# Patient Record
Sex: MALE | Race: WHITE | NOT HISPANIC OR LATINO | Employment: FULL TIME | URBAN - METROPOLITAN AREA
[De-identification: names, ages, dates, MRNs, and addresses within clinical notes are randomized per-mention and may not be internally consistent; named-entity substitution may affect disease eponyms.]

---

## 2023-09-04 PROBLEM — L91.8 OTHER HYPERTROPHIC DISORDERS OF THE SKIN: Status: ACTIVE | Noted: 2022-09-07

## 2023-09-04 PROBLEM — L81.4 OTHER MELANIN HYPERPIGMENTATION: Status: ACTIVE | Noted: 2022-09-07

## 2023-09-04 PROBLEM — R73.03 PREDIABETES: Status: ACTIVE | Noted: 2023-09-04

## 2023-09-04 PROBLEM — E78.6 LIPOPROTEIN DEFICIENCY DISORDER: Status: ACTIVE | Noted: 2023-09-04

## 2023-09-04 PROBLEM — R07.9 INTERMITTENT CHEST PAIN: Status: ACTIVE | Noted: 2023-09-04

## 2023-09-04 PROBLEM — L90.5 SCAR CONDITION AND FIBROSIS OF SKIN: Status: ACTIVE | Noted: 2022-09-07

## 2023-09-04 PROBLEM — E88.810 DYSMETABOLIC SYNDROME: Status: ACTIVE | Noted: 2023-09-04

## 2023-09-04 PROBLEM — R20.2 PARESTHESIAS: Status: ACTIVE | Noted: 2023-09-04

## 2023-09-04 PROBLEM — H69.93 DYSFUNCTION OF BOTH EUSTACHIAN TUBES: Status: ACTIVE | Noted: 2023-09-04

## 2023-09-04 PROBLEM — E78.5 HYPERLIPIDEMIA, ACQUIRED: Status: ACTIVE | Noted: 2023-09-04

## 2023-09-04 PROBLEM — J30.9 ALLERGIC RHINITIS: Status: ACTIVE | Noted: 2023-09-04

## 2023-09-04 PROBLEM — L57.0 ACTINIC KERATOSIS: Status: ACTIVE | Noted: 2022-09-07

## 2023-09-04 PROBLEM — I25.810 CAD (CORONARY ARTERY DISEASE) OF ARTERY BYPASS GRAFT: Status: ACTIVE | Noted: 2023-09-04

## 2023-09-04 PROBLEM — L82.1 OTHER SEBORRHEIC KERATOSIS: Status: ACTIVE | Noted: 2022-09-07

## 2023-09-04 PROBLEM — D18.01 HEMANGIOMA OF SKIN AND SUBCUTANEOUS TISSUE: Status: ACTIVE | Noted: 2022-09-07

## 2023-09-04 PROBLEM — I25.10 ATHEROSCLEROSIS OF CORONARY ARTERY: Status: ACTIVE | Noted: 2023-09-04

## 2023-09-04 PROBLEM — I82.90 VENOUS THROMBOSIS: Status: ACTIVE | Noted: 2023-09-04

## 2023-09-04 PROBLEM — D22.70 MELANOCYTIC NEVI OF UNSPECIFIED LOWER LIMB, INCLUDING HIP: Status: ACTIVE | Noted: 2022-09-07

## 2023-09-04 PROBLEM — Z85.828 PERSONAL HISTORY OF OTHER MALIGNANT NEOPLASM OF SKIN: Status: ACTIVE | Noted: 2022-09-07

## 2023-09-04 PROBLEM — R06.89 IMPAIRED GAS EXCHANGE: Status: ACTIVE | Noted: 2023-09-04

## 2023-09-04 PROBLEM — C44.519 BASAL CELL CARCINOMA OF SKIN OF OTHER PART OF TRUNK: Status: ACTIVE | Noted: 2022-09-07

## 2023-09-04 PROBLEM — D48.5 NEOPLASM OF UNCERTAIN BEHAVIOR OF SKIN: Status: ACTIVE | Noted: 2022-09-07

## 2023-09-04 PROBLEM — H65.03 BILATERAL ACUTE SEROUS OTITIS MEDIA: Status: ACTIVE | Noted: 2023-09-04

## 2023-09-04 PROBLEM — D22.60 MELANOCYTIC NEVI OF UNSPECIFIED UPPER LIMB, INCLUDING SHOULDER: Status: ACTIVE | Noted: 2022-09-07

## 2023-09-04 PROBLEM — D22.5 MELANOCYTIC NEVI OF TRUNK: Status: ACTIVE | Noted: 2022-09-07

## 2023-09-04 PROBLEM — R93.89 STANDARD CHEST X-RAY ABNORMAL: Status: ACTIVE | Noted: 2023-09-04

## 2023-09-04 PROBLEM — R52 PAIN: Status: ACTIVE | Noted: 2023-09-04

## 2023-09-04 RX ORDER — CETIRIZINE HYDROCHLORIDE 10 MG/1
1 TABLET ORAL NIGHTLY
COMMUNITY
Start: 2021-11-12

## 2023-09-04 RX ORDER — ALIROCUMAB 75 MG/ML
INJECTION, SOLUTION SUBCUTANEOUS
COMMUNITY
Start: 2020-12-24

## 2023-09-04 RX ORDER — ALBUTEROL SULFATE 0.83 MG/ML
3 SOLUTION RESPIRATORY (INHALATION) EVERY 4 HOURS PRN
COMMUNITY

## 2023-09-04 RX ORDER — NAPROXEN SODIUM 220 MG/1
1 TABLET, FILM COATED ORAL DAILY
COMMUNITY

## 2023-09-04 RX ORDER — GUAIFENESIN 600 MG/1
1 TABLET, EXTENDED RELEASE ORAL 2 TIMES DAILY
COMMUNITY

## 2023-09-04 RX ORDER — NITROGLYCERIN 0.4 MG/1
1 TABLET SUBLINGUAL AS NEEDED
COMMUNITY

## 2023-09-04 RX ORDER — TRAMADOL HYDROCHLORIDE 50 MG/1
1 TABLET ORAL EVERY 6 HOURS PRN
COMMUNITY

## 2023-09-04 RX ORDER — MELOXICAM 15 MG/1
1 TABLET ORAL DAILY
COMMUNITY
Start: 2023-08-02

## 2023-09-04 RX ORDER — LEVOFLOXACIN 750 MG/1
1 TABLET ORAL DAILY
COMMUNITY

## 2023-12-16 ENCOUNTER — LAB (OUTPATIENT)
Dept: LAB | Facility: LAB | Age: 63
End: 2023-12-16
Payer: COMMERCIAL

## 2023-12-18 ENCOUNTER — TELEPHONE (OUTPATIENT)
Dept: CARDIOLOGY | Facility: CLINIC | Age: 63
End: 2023-12-18

## 2023-12-18 ENCOUNTER — LAB (OUTPATIENT)
Dept: LAB | Facility: LAB | Age: 63
End: 2023-12-18
Payer: COMMERCIAL

## 2023-12-18 DIAGNOSIS — E78.5 HYPERLIPIDEMIA, ACQUIRED: ICD-10-CM

## 2023-12-18 DIAGNOSIS — E78.5 HYPERLIPIDEMIA, ACQUIRED: Primary | ICD-10-CM

## 2023-12-18 LAB
CHOLEST SERPL-MCNC: 82 MG/DL (ref 0–199)
CHOLESTEROL/HDL RATIO: 2.1
HDLC SERPL-MCNC: 38.7 MG/DL
LDLC SERPL CALC-MCNC: 9 MG/DL
NON HDL CHOLESTEROL: 43 MG/DL (ref 0–149)
TRIGL SERPL-MCNC: 174 MG/DL (ref 0–149)
VLDL: 35 MG/DL (ref 0–40)

## 2023-12-18 PROCEDURE — 36415 COLL VENOUS BLD VENIPUNCTURE: CPT

## 2023-12-18 PROCEDURE — 80061 LIPID PANEL: CPT

## 2023-12-20 ENCOUNTER — OFFICE VISIT (OUTPATIENT)
Dept: CARDIOLOGY | Facility: CLINIC | Age: 63
End: 2023-12-20
Payer: COMMERCIAL

## 2023-12-20 VITALS
HEART RATE: 62 BPM | BODY MASS INDEX: 30.84 KG/M2 | SYSTOLIC BLOOD PRESSURE: 122 MMHG | OXYGEN SATURATION: 97 % | DIASTOLIC BLOOD PRESSURE: 80 MMHG | WEIGHT: 227.4 LBS

## 2023-12-20 DIAGNOSIS — I25.10 ATHEROSCLEROSIS OF NATIVE CORONARY ARTERY OF NATIVE HEART, UNSPECIFIED WHETHER ANGINA PRESENT: Primary | ICD-10-CM

## 2023-12-20 DIAGNOSIS — E78.5 HYPERLIPIDEMIA, ACQUIRED: ICD-10-CM

## 2023-12-20 PROCEDURE — 99213 OFFICE O/P EST LOW 20 MIN: CPT | Performed by: INTERNAL MEDICINE

## 2023-12-20 RX ORDER — EVOLOCUMAB 140 MG/ML
140 INJECTION, SOLUTION SUBCUTANEOUS
COMMUNITY

## 2023-12-20 ASSESSMENT — ENCOUNTER SYMPTOMS
BLURRED VISION: 0
PALPITATIONS: 0
PARESTHESIAS: 0
DYSPNEA ON EXERTION: 0
ABDOMINAL PAIN: 0
COUGH: 0
DYSURIA: 0
NUMBNESS: 0
HEMATURIA: 0
SHORTNESS OF BREATH: 0

## 2023-12-20 ASSESSMENT — PAIN SCALES - GENERAL: PAINLEVEL: 0-NO PAIN

## 2023-12-20 NOTE — PROGRESS NOTES
Subjective   Alex Ball is a 63 y.o. male.    Chief Complaint:  Follow-up    HPI  Rare atypical chest pains, no anginal type pains.  Physically active without restrictions.  Review of Systems   Constitutional: Positive for malaise/fatigue.   HENT:  Negative for congestion.    Eyes:  Negative for blurred vision.   Cardiovascular:  Negative for chest pain, dyspnea on exertion and palpitations.   Respiratory:  Negative for cough and shortness of breath.    Musculoskeletal:  Negative for joint pain.   Gastrointestinal:  Negative for abdominal pain.   Genitourinary:  Negative for dysuria and hematuria.   Neurological:  Negative for numbness and paresthesias.       Objective   Constitutional:       Appearance: Not in distress.   Eyes:      Conjunctiva/sclera: Conjunctivae normal.   Neck:      Vascular: JVD normal.   Pulmonary:      Breath sounds: Normal breath sounds. No wheezing. No rhonchi. No rales.   Cardiovascular:      Normal rate. Regular rhythm.      Murmurs: There is no murmur.      No gallop.  No click. No rub.   Abdominal:      Palpations: Abdomen is soft.   Neurological:      General: No focal deficit present.      Mental Status: Alert.         Lab Review:   Lab on 12/18/2023   Component Date Value    Cholesterol 12/18/2023 82     HDL-Cholesterol 12/18/2023 38.7     Cholesterol/HDL Ratio 12/18/2023 2.1     LDL Calculated 12/18/2023 9     VLDL 12/18/2023 35     Triglycerides 12/18/2023 174 (H)     Non HDL Cholesterol 12/18/2023 43        Assessment/Plan   The primary encounter diagnosis was Atherosclerosis of native coronary artery of native heart, unspecified whether angina present. A diagnosis of Hyperlipidemia, acquired was also pertinent to this visit.    Hyperlipidemia, acquired  Reviewed lipid panel:  Tchol 82  HDL 38 LDL 9  Repatha has been very effective.  Will continue.  Recheck panel on return.  Should we reduce to once daily?    Atherosclerosis of coronary artery  Cardiac wise stable  without anginal type chest pains.  Stays active.

## 2023-12-20 NOTE — ASSESSMENT & PLAN NOTE
Reviewed lipid panel:  Tchol 82  HDL 38 LDL 9  Repatha has been very effective.  Will continue.  Recheck panel on return.  Should we reduce to once daily?

## 2023-12-29 DIAGNOSIS — J30.9 ALLERGIC RHINITIS, UNSPECIFIED: ICD-10-CM

## 2024-01-03 RX ORDER — FLUTICASONE PROPIONATE 50 MCG
2 SPRAY, SUSPENSION (ML) NASAL DAILY
Qty: 48 ML | Refills: 1 | Status: SHIPPED | OUTPATIENT
Start: 2024-01-03

## 2024-02-04 DIAGNOSIS — E78.5 HYPERLIPIDEMIA, ACQUIRED: ICD-10-CM

## 2024-02-04 DIAGNOSIS — I25.10 ATHEROSCLEROSIS OF NATIVE CORONARY ARTERY OF NATIVE HEART, UNSPECIFIED WHETHER ANGINA PRESENT: Primary | ICD-10-CM

## 2024-02-16 NOTE — TELEPHONE ENCOUNTER
Patient called the office today for a refill Repatha SureClick 140mg/ML Solution Auto-injector sent to [address]  [place] phone [phone]  Thanks

## 2024-02-19 RX ORDER — EVOLOCUMAB 140 MG/ML
140 INJECTION, SOLUTION SUBCUTANEOUS
Qty: 6 ML | Refills: 3 | Status: SHIPPED | OUTPATIENT
Start: 2024-02-19 | End: 2025-02-18

## 2024-03-25 ENCOUNTER — TELEPHONE (OUTPATIENT)
Dept: VASCULAR SURGERY | Facility: CLINIC | Age: 64
End: 2024-03-25
Payer: COMMERCIAL

## 2024-06-28 PROBLEM — I25.810 CAD (CORONARY ARTERY DISEASE) OF ARTERY BYPASS GRAFT: Status: RESOLVED | Noted: 2023-09-04 | Resolved: 2024-06-28

## 2024-06-28 NOTE — ASSESSMENT & PLAN NOTE
Lipids done in December: Tchol 82  HDL 38 LDL 9  The Repatha has been very effective in reducing LDL cholesterol.  Will plan on rechecking lipids on return visit.

## 2024-07-01 ENCOUNTER — LAB (OUTPATIENT)
Dept: LAB | Facility: LAB | Age: 64
End: 2024-07-01
Payer: COMMERCIAL

## 2024-07-01 DIAGNOSIS — E78.5 HYPERLIPIDEMIA, ACQUIRED: ICD-10-CM

## 2024-07-01 LAB
CHOLEST SERPL-MCNC: 75 MG/DL (ref 133–200)
CHOLEST/HDLC SERPL: 2.3 {RATIO}
HDLC SERPL-MCNC: 32 MG/DL
LDLC SERPL CALC-MCNC: 13 MG/DL (ref 65–130)
TRIGL SERPL-MCNC: 151 MG/DL (ref 40–150)

## 2024-07-01 PROCEDURE — 36415 COLL VENOUS BLD VENIPUNCTURE: CPT

## 2024-07-01 PROCEDURE — 80061 LIPID PANEL: CPT

## 2024-07-02 ENCOUNTER — OFFICE VISIT (OUTPATIENT)
Dept: CARDIOLOGY | Facility: CLINIC | Age: 64
End: 2024-07-02
Payer: COMMERCIAL

## 2024-07-02 VITALS
SYSTOLIC BLOOD PRESSURE: 130 MMHG | HEART RATE: 70 BPM | DIASTOLIC BLOOD PRESSURE: 80 MMHG | BODY MASS INDEX: 29.57 KG/M2 | WEIGHT: 218 LBS

## 2024-07-02 DIAGNOSIS — I25.10 ATHEROSCLEROSIS OF NATIVE CORONARY ARTERY OF NATIVE HEART, UNSPECIFIED WHETHER ANGINA PRESENT: Primary | ICD-10-CM

## 2024-07-02 DIAGNOSIS — E78.5 HYPERLIPIDEMIA, ACQUIRED: ICD-10-CM

## 2024-07-02 PROCEDURE — 99213 OFFICE O/P EST LOW 20 MIN: CPT | Performed by: INTERNAL MEDICINE

## 2024-07-02 PROCEDURE — 1036F TOBACCO NON-USER: CPT | Performed by: INTERNAL MEDICINE

## 2024-07-02 ASSESSMENT — ENCOUNTER SYMPTOMS
NUMBNESS: 0
PARESTHESIAS: 0
SHORTNESS OF BREATH: 0
LOSS OF SENSATION IN FEET: 0
BLURRED VISION: 0
PALPITATIONS: 0
COUGH: 0
HEMATURIA: 0
DYSURIA: 0
OCCASIONAL FEELINGS OF UNSTEADINESS: 0
DYSPNEA ON EXERTION: 0
DEPRESSION: 0
ABDOMINAL PAIN: 0

## 2024-07-02 NOTE — PROGRESS NOTES
Subjective   Alex Ball is a 64 y.o. male.    Chief Complaint:  Follow-up    HPI  Overall patient feels well.  He has some left hip pain that limits his activity level.  No anginal type pains.  Stays active working on a daily basis.  Review of Systems   Constitutional: Negative for malaise/fatigue.   HENT:  Negative for congestion.    Eyes:  Negative for blurred vision.   Cardiovascular:  Negative for chest pain, dyspnea on exertion and palpitations.   Respiratory:  Negative for cough and shortness of breath.    Musculoskeletal:  Positive for joint pain.   Gastrointestinal:  Negative for abdominal pain.   Genitourinary:  Negative for dysuria and hematuria.   Neurological:  Negative for numbness and paresthesias.       Objective   Constitutional:       Appearance: Not in distress.   Eyes:      Conjunctiva/sclera: Conjunctivae normal.   Neck:      Vascular: JVD normal.   Pulmonary:      Breath sounds: Normal breath sounds. No wheezing. No rhonchi. No rales.   Cardiovascular:      Normal rate. Regular rhythm.      Murmurs: There is no murmur.      No gallop.  No click. No rub.   Abdominal:      Palpations: Abdomen is soft.   Neurological:      General: No focal deficit present.      Mental Status: Alert.         Lab Review:   Lab on 07/01/2024   Component Date Value    Cholesterol 07/01/2024 75 (L)     HDL-Cholesterol 07/01/2024 32.0 (L)     Cholesterol/HDL Ratio 07/01/2024 2.3     LDL Calculated 07/01/2024 13 (L)     Triglycerides 07/01/2024 151 (H)        Assessment/Plan   The primary encounter diagnosis was Atherosclerosis of native coronary artery of native heart, unspecified whether angina present. A diagnosis of Hyperlipidemia, acquired was also pertinent to this visit.    Hyperlipidemia, acquired  Lipids done in December: Tchol 82  HDL 38 LDL 9  The Repatha has been very effective in reducing LDL cholesterol.  Will plan on rechecking lipids on return visit.    Atherosclerosis of coronary  artery  Stable with no anginal type pains.  Remains physically active with some limitations based on his hip pain.  Will continue standard risk factor modification and follow on a clinical basis.  Blood pressure was borderline today we did discuss saman sodium restriction and exercise to reduce blood pressure.

## 2024-07-02 NOTE — ASSESSMENT & PLAN NOTE
Stable with no anginal type pains.  Remains physically active with some limitations based on his hip pain.  Will continue standard risk factor modification and follow on a clinical basis.  Blood pressure was borderline today we did discuss saman sodium restriction and exercise to reduce blood pressure.

## 2024-09-18 ENCOUNTER — APPOINTMENT (OUTPATIENT)
Dept: DERMATOLOGY | Facility: CLINIC | Age: 64
End: 2024-09-18
Payer: COMMERCIAL

## 2024-11-14 ENCOUNTER — TELEPHONE (OUTPATIENT)
Dept: DERMATOLOGY | Facility: CLINIC | Age: 64
End: 2024-11-14
Payer: COMMERCIAL

## 2024-12-10 ENCOUNTER — APPOINTMENT (OUTPATIENT)
Dept: DERMATOLOGY | Facility: CLINIC | Age: 64
End: 2024-12-10
Payer: COMMERCIAL

## 2025-01-10 ENCOUNTER — OFFICE VISIT (OUTPATIENT)
Dept: URGENT CARE | Age: 65
End: 2025-01-10
Payer: COMMERCIAL

## 2025-01-10 VITALS
TEMPERATURE: 98.1 F | DIASTOLIC BLOOD PRESSURE: 90 MMHG | WEIGHT: 218 LBS | BODY MASS INDEX: 29.57 KG/M2 | SYSTOLIC BLOOD PRESSURE: 157 MMHG | HEART RATE: 65 BPM | RESPIRATION RATE: 16 BRPM | OXYGEN SATURATION: 96 %

## 2025-01-10 DIAGNOSIS — M54.50 ACUTE LEFT-SIDED LOW BACK PAIN, UNSPECIFIED WHETHER SCIATICA PRESENT: Primary | ICD-10-CM

## 2025-01-10 RX ORDER — PREDNISONE 20 MG/1
20 TABLET ORAL DAILY
Qty: 5 TABLET | Refills: 0 | Status: SHIPPED | OUTPATIENT
Start: 2025-01-10 | End: 2025-01-15

## 2025-01-10 ASSESSMENT — ENCOUNTER SYMPTOMS: BACK PAIN: 1

## 2025-01-10 NOTE — PATIENT INSTRUCTIONS
Start Prednisone as directed, go to emergency department with worsening symptoms, follow up with orthopedics

## 2025-01-10 NOTE — PROGRESS NOTES
Subjective   Patient ID: Alex Ball is a 64 y.o. male. They present today with a chief complaint of Back Pain (Was lifting windows at home happened a week ago).    History of Present Illness  HPI    Past Medical History  Allergies as of 01/10/2025 - Reviewed 01/10/2025   Allergen Reaction Noted    Atorvastatin calcium Other 09/04/2023    Pravastatin sodium Other 09/04/2023    Rosuvastatin calcium Other 09/04/2023    Simvastatin Other 09/04/2023    Penicillins Unknown 05/31/2019       (Not in a hospital admission)       Past Medical History:   Diagnosis Date    CAD (coronary artery disease)     Hyperlipidemia        No past surgical history on file.     reports that he has quit smoking. His smoking use included cigarettes. He has never used smokeless tobacco. He reports current alcohol use.    Review of Systems  Review of Systems   Musculoskeletal:  Positive for back pain.                                  Objective    Vitals:    01/10/25 0948   BP: 157/90   BP Location: Left arm   Patient Position: Sitting   BP Cuff Size: Large adult   Pulse: 65   Resp: 16   Temp: 36.7 °C (98.1 °F)   TempSrc: Oral   SpO2: 96%   Weight: 98.9 kg (218 lb)     No LMP for male patient.    Physical Exam  Vitals reviewed.   Constitutional:       Appearance: Normal appearance.   HENT:      Head: Normocephalic and atraumatic.      Right Ear: Tympanic membrane, ear canal and external ear normal.      Left Ear: Tympanic membrane, ear canal and external ear normal.      Nose: Nose normal.      Mouth/Throat:      Mouth: Mucous membranes are moist.      Pharynx: Oropharynx is clear.   Eyes:      Extraocular Movements: Extraocular movements intact.      Conjunctiva/sclera: Conjunctivae normal.      Pupils: Pupils are equal, round, and reactive to light.   Cardiovascular:      Rate and Rhythm: Normal rate and regular rhythm.      Pulses: Normal pulses.      Heart sounds: Normal heart sounds.   Pulmonary:      Effort: Pulmonary effort is  normal.      Breath sounds: Normal breath sounds.   Musculoskeletal:         General: Tenderness present. Normal range of motion.      Cervical back: Normal range of motion.   Skin:     General: Skin is warm.      Capillary Refill: Capillary refill takes less than 2 seconds.   Neurological:      General: No focal deficit present.      Mental Status: He is alert and oriented to person, place, and time.   Psychiatric:         Mood and Affect: Mood normal.         Behavior: Behavior normal.         Procedures    Point of Care Test & Imaging Results from this visit  No results found for this visit on 01/10/25.   No results found.    Diagnostic study results (if any) were reviewed by MARY Do.    Assessment/Plan   Allergies, medications, history, and pertinent labs/EKGs/Imaging reviewed by MARY Do.     Medical Decision Making  Patient is 64-year-old male who has chronic back pain.  He reports that his back started being aggravated couple days ago.  Said he did lift some windows a week ago.  Pain is on the left side.  There is no obvious deformity.  We do not have radiology here today.  Patient says he has had x-rays in the past is does not want an x-ray today patient just has mild pain denies numbness or tingling in buttocks or groin denies loss of bowel or bladder.  Denies numbness in legs.  He reports that he did feel a little bit of pain going down his left leg.  Patient is to start prednisone as directed and follow-up with orthopedics tomorrow.  Patient is to go to the emergency department with any red flags and any worsening symptoms if he feels any numbness in his buttocks or groin or loss of bowel or bladder patient is to report to the emergency department patient agrees with plan of care patient left in stable condition    Orders and Diagnoses  There are no diagnoses linked to this encounter.    Medical Admin Record      Patient disposition:  Home    Electronically signed by SHIRLEY Do-RUSTY  10:27 AM

## 2025-01-29 NOTE — ASSESSMENT & PLAN NOTE
Reviewed lipid panel.  LDL cholesterol down to 28 on the Repatha therapy.  Very good.  Will recheck after next visit.

## 2025-02-01 LAB
CHOLEST SERPL-MCNC: 85 MG/DL
CHOLEST/HDLC SERPL: 2.6 (CALC)
HDLC SERPL-MCNC: 33 MG/DL
LDLC SERPL CALC-MCNC: 28 MG/DL (CALC)
NONHDLC SERPL-MCNC: 52 MG/DL (CALC)
TRIGL SERPL-MCNC: 154 MG/DL

## 2025-02-04 ENCOUNTER — OFFICE VISIT (OUTPATIENT)
Dept: CARDIOLOGY | Facility: CLINIC | Age: 65
End: 2025-02-04
Payer: COMMERCIAL

## 2025-02-04 VITALS
BODY MASS INDEX: 30.38 KG/M2 | SYSTOLIC BLOOD PRESSURE: 158 MMHG | WEIGHT: 224 LBS | OXYGEN SATURATION: 97 % | RESPIRATION RATE: 16 BRPM | DIASTOLIC BLOOD PRESSURE: 90 MMHG | HEART RATE: 58 BPM

## 2025-02-04 DIAGNOSIS — E78.5 HYPERLIPIDEMIA, ACQUIRED: ICD-10-CM

## 2025-02-04 DIAGNOSIS — I10 PRIMARY HYPERTENSION: ICD-10-CM

## 2025-02-04 DIAGNOSIS — I25.10 ATHEROSCLEROSIS OF NATIVE CORONARY ARTERY OF NATIVE HEART, UNSPECIFIED WHETHER ANGINA PRESENT: Primary | ICD-10-CM

## 2025-02-04 PROCEDURE — 1036F TOBACCO NON-USER: CPT | Performed by: INTERNAL MEDICINE

## 2025-02-04 PROCEDURE — 99214 OFFICE O/P EST MOD 30 MIN: CPT | Performed by: INTERNAL MEDICINE

## 2025-02-04 PROCEDURE — 3077F SYST BP >= 140 MM HG: CPT | Performed by: INTERNAL MEDICINE

## 2025-02-04 PROCEDURE — 1126F AMNT PAIN NOTED NONE PRSNT: CPT | Performed by: INTERNAL MEDICINE

## 2025-02-04 PROCEDURE — 3080F DIAST BP >= 90 MM HG: CPT | Performed by: INTERNAL MEDICINE

## 2025-02-04 PROCEDURE — 1159F MED LIST DOCD IN RCRD: CPT | Performed by: INTERNAL MEDICINE

## 2025-02-04 RX ORDER — AMLODIPINE BESYLATE 5 MG/1
5 TABLET ORAL DAILY
Qty: 90 TABLET | Refills: 3 | Status: SHIPPED | OUTPATIENT
Start: 2025-02-04 | End: 2026-02-04

## 2025-02-04 ASSESSMENT — ENCOUNTER SYMPTOMS
BLURRED VISION: 0
PARESTHESIAS: 0
LOSS OF SENSATION IN FEET: 0
DYSPNEA ON EXERTION: 0
BACK PAIN: 1
OCCASIONAL FEELINGS OF UNSTEADINESS: 0
ABDOMINAL PAIN: 0
PALPITATIONS: 0
DYSURIA: 0
COUGH: 0
HEMATURIA: 0
NUMBNESS: 0
DEPRESSION: 0
SHORTNESS OF BREATH: 0

## 2025-02-04 ASSESSMENT — PAIN SCALES - GENERAL: PAINLEVEL_OUTOF10: 0-NO PAIN

## 2025-02-04 ASSESSMENT — PATIENT HEALTH QUESTIONNAIRE - PHQ9
SUM OF ALL RESPONSES TO PHQ9 QUESTIONS 1 AND 2: 0
1. LITTLE INTEREST OR PLEASURE IN DOING THINGS: NOT AT ALL
2. FEELING DOWN, DEPRESSED OR HOPELESS: NOT AT ALL

## 2025-02-04 ASSESSMENT — LIFESTYLE VARIABLES
HAVE YOU OR SOMEONE ELSE BEEN INJURED AS A RESULT OF YOUR DRINKING: NO
HAS A RELATIVE, FRIEND, DOCTOR, OR ANOTHER HEALTH PROFESSIONAL EXPRESSED CONCERN ABOUT YOUR DRINKING OR SUGGESTED YOU CUT DOWN: NO
HOW OFTEN DO YOU HAVE A DRINK CONTAINING ALCOHOL: MONTHLY OR LESS
AUDIT-C TOTAL SCORE: 1
AUDIT TOTAL SCORE: 1
HOW MANY STANDARD DRINKS CONTAINING ALCOHOL DO YOU HAVE ON A TYPICAL DAY: 1 OR 2
SKIP TO QUESTIONS 9-10: 1
HOW OFTEN DO YOU HAVE SIX OR MORE DRINKS ON ONE OCCASION: NEVER

## 2025-02-04 NOTE — ASSESSMENT & PLAN NOTE
Stable with no anginal type chest pains.  We will continue standard risk factor modification and follow on a clinical basis.

## 2025-02-04 NOTE — ASSESSMENT & PLAN NOTE
Blood pressure remains elevated.  He is also taking meloxicam on a daily basis and has significant back and leg pains.  This may be driving the blood pressure as well.  Either way I do not think there is going to be a rapid resolution of the discomfort thus I believe we should add an antihypertensive agent.  Will order a basic metabolic panel to assess kidney function as well as a renin angiotensin ratio.  Will add amlodipine 5 mg daily and bring the patient back in approximately 12 weeks and reassess blood pressure effects.

## 2025-02-04 NOTE — PROGRESS NOTES
Subjective   Alex Ball is a 65 y.o. male.    Chief Complaint:  Follow-up (Follow up/)    HPI  Patient states that his back pain and sciatica is flaring up.  Otherwise feels relatively good.  No chest pain or anginal type symptoms.  Still works on a regular basis.    Review of Systems   Constitutional: Negative for malaise/fatigue.   HENT:  Negative for congestion.    Eyes:  Negative for blurred vision.   Cardiovascular:  Negative for chest pain, dyspnea on exertion and palpitations.   Respiratory:  Negative for cough and shortness of breath.    Musculoskeletal:  Positive for back pain and joint pain.   Gastrointestinal:  Negative for abdominal pain.   Genitourinary:  Negative for dysuria and hematuria.   Neurological:  Negative for numbness and paresthesias.       Objective   Constitutional:       Appearance: Not in distress.   Eyes:      Conjunctiva/sclera: Conjunctivae normal.   Neck:      Vascular: JVD normal.   Pulmonary:      Breath sounds: Normal breath sounds. No wheezing. No rhonchi. No rales.   Cardiovascular:      Normal rate. Regular rhythm.      Murmurs: There is no murmur.      No gallop.  No click. No rub.   Abdominal:      Palpations: Abdomen is soft.   Neurological:      General: No focal deficit present.      Mental Status: Alert.         Lab Review:   Orders Only on 01/31/2025   Component Date Value    CHOLESTEROL, TOTAL 01/31/2025 85     HDL CHOLESTEROL 01/31/2025 33 (L)     TRIGLYCERIDES 01/31/2025 154 (H)     LDL-CHOLESTEROL 01/31/2025 28     CHOL/HDLC RATIO 01/31/2025 2.6     NON HDL CHOLESTEROL 01/31/2025 52        Assessment/Plan   The primary encounter diagnosis was Atherosclerosis of native coronary artery of native heart, unspecified whether angina present. Diagnoses of Hyperlipidemia, acquired and Primary hypertension were also pertinent to this visit.    Hyperlipidemia, acquired  Reviewed lipid panel.  LDL cholesterol down to 28 on the Repatha therapy.  Very good.  Will  recheck after next visit.    Atherosclerosis of coronary artery  Stable with no anginal type chest pains.  We will continue standard risk factor modification and follow on a clinical basis.    Primary hypertension  Blood pressure remains elevated.  He is also taking meloxicam on a daily basis and has significant back and leg pains.  This may be driving the blood pressure as well.  Either way I do not think there is going to be a rapid resolution of the discomfort thus I believe we should add an antihypertensive agent.  Will order a basic metabolic panel to assess kidney function as well as a renin angiotensin ratio.  Will add amlodipine 5 mg daily and bring the patient back in approximately 12 weeks and reassess blood pressure effects.

## 2025-02-06 LAB
ALDOST SERPL-MCNC: NORMAL NG/DL
ALDOST/RENIN PLAS-RTO: NORMAL {RATIO}
ANION GAP SERPL CALCULATED.4IONS-SCNC: 10 MMOL/L (CALC) (ref 7–17)
BUN SERPL-MCNC: 15 MG/DL (ref 7–25)
BUN/CREAT SERPL: NORMAL (CALC) (ref 6–22)
CALCIUM SERPL-MCNC: 9.4 MG/DL (ref 8.6–10.3)
CHLORIDE SERPL-SCNC: 105 MMOL/L (ref 98–110)
CO2 SERPL-SCNC: 26 MMOL/L (ref 20–32)
CREAT SERPL-MCNC: 0.71 MG/DL (ref 0.7–1.35)
EGFRCR SERPLBLD CKD-EPI 2021: 102 ML/MIN/1.73M2
GLUCOSE SERPL-MCNC: 98 MG/DL (ref 65–99)
POTASSIUM SERPL-SCNC: 4.4 MMOL/L (ref 3.5–5.3)
RENIN PLAS-CCNC: NORMAL NG/ML/H
SODIUM SERPL-SCNC: 141 MMOL/L (ref 135–146)

## 2025-02-15 LAB
ALDOST SERPL-MCNC: 3 NG/DL
ALDOST/RENIN PLAS-RTO: 5.8 RATIO (ref 0.9–28.9)
ANION GAP SERPL CALCULATED.4IONS-SCNC: 10 MMOL/L (CALC) (ref 7–17)
BUN SERPL-MCNC: 15 MG/DL (ref 7–25)
BUN/CREAT SERPL: NORMAL (CALC) (ref 6–22)
CALCIUM SERPL-MCNC: 9.4 MG/DL (ref 8.6–10.3)
CHLORIDE SERPL-SCNC: 105 MMOL/L (ref 98–110)
CO2 SERPL-SCNC: 26 MMOL/L (ref 20–32)
CREAT SERPL-MCNC: 0.71 MG/DL (ref 0.7–1.35)
EGFRCR SERPLBLD CKD-EPI 2021: 102 ML/MIN/1.73M2
GLUCOSE SERPL-MCNC: 98 MG/DL (ref 65–99)
POTASSIUM SERPL-SCNC: 4.4 MMOL/L (ref 3.5–5.3)
RENIN PLAS-CCNC: 0.52 NG/ML/H (ref 0.25–5.82)
SODIUM SERPL-SCNC: 141 MMOL/L (ref 135–146)

## 2025-02-26 DIAGNOSIS — E78.5 HYPERLIPIDEMIA, ACQUIRED: ICD-10-CM

## 2025-02-26 DIAGNOSIS — I25.10 ATHEROSCLEROSIS OF NATIVE CORONARY ARTERY OF NATIVE HEART, UNSPECIFIED WHETHER ANGINA PRESENT: ICD-10-CM

## 2025-02-27 RX ORDER — EVOLOCUMAB 140 MG/ML
INJECTION, SOLUTION SUBCUTANEOUS
Qty: 2 EACH | Refills: 11 | Status: SHIPPED | OUTPATIENT
Start: 2025-02-27

## 2025-02-27 NOTE — TELEPHONE ENCOUNTER
LOV: 2025  Upcomin/15/2025    Requested Prescriptions     Pending Prescriptions Disp Refills    Repatha SureClick 140 mg/mL injection [Pharmacy Med Name: REPATHA 140 MG/ML SURECLICK]  11     Sig: INJECT 1 ML UNDER THE SKIN EVERY 14 DAYS

## 2025-03-26 ENCOUNTER — OFFICE VISIT (OUTPATIENT)
Dept: OTOLARYNGOLOGY | Facility: CLINIC | Age: 65
End: 2025-03-26
Payer: COMMERCIAL

## 2025-03-26 DIAGNOSIS — H69.93 DYSFUNCTION OF BOTH EUSTACHIAN TUBES: Primary | ICD-10-CM

## 2025-03-26 RX ORDER — FLUTICASONE PROPIONATE 50 MCG
2 SPRAY, SUSPENSION (ML) NASAL DAILY
Qty: 48 G | Refills: 7 | Status: SHIPPED | OUTPATIENT
Start: 2025-03-26 | End: 2026-03-26

## 2025-03-26 RX ORDER — FLUTICASONE PROPIONATE 50 MCG
2 SPRAY, SUSPENSION (ML) NASAL DAILY
Qty: 48 G | Refills: 3 | Status: SHIPPED | OUTPATIENT
Start: 2025-03-26 | End: 2025-03-26 | Stop reason: WASHOUT

## 2025-03-26 NOTE — PROGRESS NOTES
Chief Complaint     Ear problem, ETD     History of Present Illness    03.26.2025: He feels bilateral ear fullness, if he does not use fluticasone nasal spray. History of ETD. Wants a refill.    On examination, TMs look intact. Nasal septum deviated t or left, no visible nasal or postnasal discharge. No palpable neck mass.    Plan:  1- continue fluticasone nasal spray  _________________________________________________________________    10.21.2022:    Mr. Ball is a 61 yo M. His right ear gets clogged off and on, lately his left ear does it too.  Secondly, he snores at night. His wife is worries about him having CHRISSY.     On examination, ears look essentially normal  Nasal septum deviated to left  Long uvula     Dx:  1- Disorder of ETD  2- Snoring possible CHRISSY  3- Deviated nasal septum to left, long uvula.     Plan;  1- FLuticasone nasal spray  2- pseudoephedrine 120 mg tab  3- home seep study      Review of Systems     ENMT: the ears feel full.   All other systems have been reviewed and are negative for complaint.      Active Problems     · Allergic rhinitis (477.9) (J30.9)   · Atherosclerosis of coronary artery (414.00) (I25.10)   · Bilateral acute serous otitis media, recurrence not specified (381.01) (H65.03)   · Dysfunction of both eustachian tubes (381.81) (H69.83)   · Dysmetabolic syndrome (277.7) (E88.81)   · Hyperlipidemia (272.4) (E78.5)   · Paresthesias (782.0) (R20.2)   · Prediabetes (790.29) (R73.03)   · Sore throat (462) (J02.9)     Family History     · Family history of diabetes mellitus (V18.0) (Z83.3)     Social History     · Cigar smoker (305.1) (F17.290)   · Never a smoker   · Occasional alcohol use     Allergies     · Penicillins   Recorded By: Jemma Ramsay; 10/29/2021 10:24:27 AM     Current Meds     Medication Name Instruction   Amoxicillin 875 MG Oral Tablet i po bid x 7 days   Cetirizine HCl - 10 MG Oral Tablet TAKE 1 TABLET BY MOUTH EVERYDAY AT BEDTIME   methylPREDNISolone 4 MG Oral  Tablet vi po qd, then v po qd, then iv po qd, then iii po qd, then ii po qd, then i po qd, then stop   Praluent 75 MG/ML Subcutaneous Solution Auto-injector        Physical Exam  General appearance: Healthy-appearing, well-nourished, well groomed, in no acute distress.      Head and Face: Atraumatic with no masses, lesions, or scarring.      Facial strength: Normal strength and symmetry, no synkinesis or facial tic.      Eyes: Conjunctivas look non-hyperemic bilaterally     Ears: Bilaterally ear canals look normal. Tympanic membranes intact, no hyperemia, fluid or retraction.      Nose: Mucosa looks normal. No purulent discharge. Septum deviated to left.     Oral Cavity/Mouth: Lips and tongue look normal.      Throat: No postnasal discharge. No tonsil hypertrophy. No hyperemia.     Pulmonary: Normal respiratory effort.      Neurological/Psychiatric: Orientation to person, place, and time: Normal.   Mood and affect: Normal.      Extremities: No clubbing.          Diagnoses/Problems     · Dysfunction of both eustachian tubes (381.81) (H69.83)   · Obstructive sleep apnea (327.23) (G47.33)     Orders     · Start: Fluticasone Propionate 50 MCG/ACT Nasal Suspension; USE 2 SPRAYS IN EACH  NOSTRIL ONCE DAILY   · Start: Pseudoephedrine HCl  MG Oral Tablet Extended Release 12 Hour; Take 1  tablet every 12 hours     · Home Sleep Apnea Test; Status:Hold For - Scheduling; Requested for:[id number];    · SINDI; Status:In Progress;   Done: [id number]     Patient Discussion/Summary     03.26.2025: He feels bilateral ear fullness, if he does not use fluticasone nasal spray. History of ETD. Wants a refill.    On examination, TMs look intact. Nasal septum deviated t or left, no visible nasal or postnasal discharge. No palpable neck mass.    Plan:  1- continue fluticasone nasal spray  _________________________________________________________________    10.21.2022: Mr. Ball is a 61 yo M. His right ear gets clogged off and on, lately  his left ear does it too. Secondly, he snores at night. His wife is worries about him having CHRISSY.     On examination, ears look essentially normal  Nasal septum deviated to left  Long uvula     Dx:  1- Disorder of ETD  2- Snoring possible CHRISSY  3- Deviated nasal septum to left, long uvula.     Plan;  1- FLuticasone nasal spray  2- pseudoephedrine 120 mg tab  3- home seep study     ____________________________________________________________________________________________       PATIENT EDUCATION:  Eustachian TUbe Dysfunction  Eustachian tube dysfunction (ETD) is defined as pressure abnormalities in the middle ear which result in symptoms  Signs and symptoms  Symptoms include aural fullness, ears popping, a feeling of pressure in the affected ear(s), a feeling that the affected ear(s) is clogged, crackling, ear pain, tinnitus, autophony, and muffled hearing.[

## 2025-04-09 ENCOUNTER — APPOINTMENT (OUTPATIENT)
Dept: DERMATOLOGY | Facility: CLINIC | Age: 65
End: 2025-04-09
Payer: COMMERCIAL

## 2025-04-09 DIAGNOSIS — L82.1 SEBORRHEIC KERATOSES: ICD-10-CM

## 2025-04-09 DIAGNOSIS — L57.0 ACTINIC KERATOSIS: ICD-10-CM

## 2025-04-09 DIAGNOSIS — D48.5 NEOPLASM OF UNCERTAIN BEHAVIOR OF SKIN: Primary | ICD-10-CM

## 2025-04-09 DIAGNOSIS — B36.0 TINEA VERSICOLOR: ICD-10-CM

## 2025-04-09 DIAGNOSIS — D18.01 CHERRY ANGIOMA: ICD-10-CM

## 2025-04-09 DIAGNOSIS — D22.9 MULTIPLE BENIGN MELANOCYTIC NEVI: ICD-10-CM

## 2025-04-09 DIAGNOSIS — Z12.83 SKIN CANCER SCREENING: ICD-10-CM

## 2025-04-09 DIAGNOSIS — L57.8 SUN-DAMAGED SKIN: ICD-10-CM

## 2025-04-09 PROBLEM — Z86.018 HISTORY OF DYSPLASTIC NEVUS: Status: ACTIVE | Noted: 2025-04-09

## 2025-04-09 PROCEDURE — 99214 OFFICE O/P EST MOD 30 MIN: CPT | Performed by: DERMATOLOGY

## 2025-04-09 PROCEDURE — 17000 DESTRUCT PREMALG LESION: CPT | Performed by: DERMATOLOGY

## 2025-04-09 PROCEDURE — 11102 TANGNTL BX SKIN SINGLE LES: CPT | Performed by: DERMATOLOGY

## 2025-04-09 PROCEDURE — 1159F MED LIST DOCD IN RCRD: CPT | Performed by: DERMATOLOGY

## 2025-04-09 PROCEDURE — 1036F TOBACCO NON-USER: CPT | Performed by: DERMATOLOGY

## 2025-04-09 RX ORDER — KETOCONAZOLE 20 MG/G
CREAM TOPICAL
Qty: 3060 G | Refills: 11 | Status: SHIPPED | OUTPATIENT
Start: 2025-04-09

## 2025-04-09 ASSESSMENT — ITCH NUMERIC RATING SCALE: HOW SEVERE IS YOUR ITCHING?: 0

## 2025-04-09 ASSESSMENT — DERMATOLOGY PATIENT ASSESSMENT
DO YOU USE SUNSCREEN: OCCASIONALLY
HAVE YOU HAD OR DO YOU HAVE VASCULAR DISEASE: NO
HAVE YOU HAD OR DO YOU HAVE A STAPH INFECTION: NO
ARE YOU AN ORGAN TRANSPLANT RECIPIENT: NO
DO YOU HAVE ANY NEW OR CHANGING LESIONS: NO
DO YOU USE A TANNING BED: NO

## 2025-04-09 ASSESSMENT — DERMATOLOGY QUALITY OF LIFE (QOL) ASSESSMENT
WHAT SINGLE SKIN CONDITION LISTED BELOW IS THE PATIENT ANSWERING THE QUALITY-OF-LIFE ASSESSMENT QUESTIONS ABOUT: NONE OF THE ABOVE
RATE HOW EMOTIONALLY BOTHERED YOU ARE BY YOUR SKIN PROBLEM (FOR EXAMPLE, WORRY, EMBARRASSMENT, FRUSTRATION): 0 - NEVER BOTHERED
RATE HOW BOTHERED YOU ARE BY SYMPTOMS OF YOUR SKIN PROBLEM (EG, ITCHING, STINGING BURNING, HURTING OR SKIN IRRITATION): 0 - NEVER BOTHERED
ARE THERE EXCLUSIONS OR EXCEPTIONS FOR THE QUALITY OF LIFE ASSESSMENT: NO
RATE HOW BOTHERED YOU ARE BY EFFECTS OF YOUR SKIN PROBLEMS ON YOUR ACTIVITIES (EG, GOING OUT, ACCOMPLISHING WHAT YOU WANT, WORK ACTIVITIES OR YOUR RELATIONSHIPS WITH OTHERS): 0 - NEVER BOTHERED
DATE THE QUALITY-OF-LIFE ASSESSMENT WAS COMPLETED: 67304

## 2025-04-09 ASSESSMENT — PATIENT GLOBAL ASSESSMENT (PGA): PATIENT GLOBAL ASSESSMENT: PATIENT GLOBAL ASSESSMENT:  1 - CLEAR

## 2025-04-09 NOTE — PROGRESS NOTES
Subjective     Alex Ball is a 65 y.o. male who presents for the following: Skin Check.     LOV with me 9/2023 - h/o Actinic keratoses, basal cell carcinoma, mod dysplastic nevus, prurigo nodules    Skin Cancer History  Nodular BCC + mild dysplastic nevus on mid back s/p excision 2019  Mod dysplastic nevus left lateral back s/p excision 2019    Review of Systems:  No other skin or systemic complaints other than what is documented elsewhere in the note.    The following portions of the chart were reviewed this encounter and updated as appropriate:       Specialty Problems          Dermatology Problems    Actinic keratosis    Basal cell carcinoma of skin of other part of trunk    Hemangioma of skin and subcutaneous tissue    History of basal cell carcinoma     Nodular BCC + mild dysplastic nevus on mid back s/p excision 2019           Melanocytic nevi of trunk    Melanocytic nevi of unspecified lower limb, including hip    Melanocytic nevi of unspecified upper limb, including shoulder    Neoplasm of uncertain behavior of skin    Other hypertrophic disorders of the skin    Other melanin hyperpigmentation    Other seborrheic keratosis    Scar condition and fibrosis of skin    History of dysplastic nevus     Mod dysplastic nevus left lateral back s/p excision 2019          Past Medical History:  Alex Ball  has a past medical history of CAD (coronary artery disease) and Hyperlipidemia.    Past Surgical History:  Alex Ball  has no past surgical history on file.    Family History:  Patient family history includes Cancer in his father; Diabetes in his father and mother; Hypertension in his mother.    Social History:  Alex Ball  reports that he has quit smoking. His smoking use included cigarettes. He has never used smokeless tobacco. He reports current alcohol use. He reports that he does not use drugs.    Allergies:  Atorvastatin calcium, Pravastatin sodium, Rosuvastatin calcium,  Simvastatin, and Penicillins    Current Medications / CAM's:    Current Outpatient Medications:     albuterol 2.5 mg /3 mL (0.083 %) nebulizer solution, Take 3 mL by nebulization every 4 hours if needed for shortness of breath., Disp: , Rfl:     amLODIPine (Norvasc) 5 mg tablet, Take 1 tablet (5 mg) by mouth once daily., Disp: 90 tablet, Rfl: 3    aspirin 81 mg chewable tablet, Chew 1 tablet (81 mg) once daily., Disp: , Rfl:     cetirizine (ZyrTEC) 10 mg tablet, Take 1 tablet (10 mg) by mouth once daily at bedtime., Disp: , Rfl:     evolocumab (Repatha SureClick) 140 mg/mL injection, INJECT 1 ML UNDER THE SKIN EVERY 14 DAYS, Disp: 2 each, Rfl: 11    evolocumab (Repatha Syringe) 140 mg/mL injection, Inject 1 mL (140 mg) under the skin every 14 (fourteen) days., Disp: , Rfl:     fluticasone (Flonase) 50 mcg/actuation nasal spray, Administer 2 sprays into each nostril once daily. Shake gently. Before first use, prime pump. After use, clean tip and replace cap., Disp: 48 g, Rfl: 7    guaiFENesin (Mucinex) 600 mg 12 hr tablet, Take 1 tablet (600 mg) by mouth 2 times a day., Disp: , Rfl:     levoFLOXacin (Levaquin) 750 mg tablet, Take 1 tablet (750 mg) by mouth once daily., Disp: , Rfl:     meloxicam (Mobic) 15 mg tablet, Take 1 tablet (15 mg) by mouth once daily., Disp: , Rfl:     nitroglycerin (Nitrostat) 0.4 mg SL tablet, Place 1 tablet (0.4 mg) under the tongue if needed., Disp: , Rfl:     ketoconazole (NIZOral) 2 % cream, Apply to rash on left chest/abdomen twice daily for 4 weeks. Repeat as needed, Disp: 3060 g, Rfl: 11     Objective   Well appearing patient in no apparent distress; mood and affect are within normal limits.    A full examination was performed including scalp, head, eyes, ears, nose, lips, neck, chest, axillae, abdomen, back, buttocks, bilateral upper extremities, bilateral lower extremities, hands, feet, fingers, toes, fingernails, and toenails.  Patient declined genital and gluteal cleft exam.  All  findings within normal limits unless otherwise noted below.    - scattered regular brown macules and papules    - Scattered waxy tan/grey/brown papules with horn cysts    - scattered small bright red papules and macules    - scattered tan macules, telangiectasias, and general photo-damage    Left Dorsal Hand  Keratotic crusted papule              Head - Anterior (Face)  Erythematous macules with gritty scale.    Left Inframammary Fold  Thin pink to hypo/hyperpigmented subtly scale papules and plaques on trunk         Assessment/Plan   Neoplasm of uncertain behavior of skin  Left Dorsal Hand    Lesion biopsy  Type of biopsy: tangential    Informed consent: discussed and consent obtained    Timeout: patient name, date of birth, surgical site, and procedure verified    Procedure prep:  Patient was prepped and draped  Anesthesia: the lesion was anesthetized in a standard fashion    Anesthetic:  1% lidocaine w/ epinephrine 1-100,000 local infiltration  Instrument used: DermaBlade    Hemostasis achieved with: aluminum chloride    Outcome: patient tolerated procedure well    Post-procedure details: sterile dressing applied and wound care instructions given    Dressing type: petrolatum and bandage      Staff Communication: Dermatology Local Anesthesia: 1 % Lidocaine / Epinephrine - Amount: 1mL    Specimen 1 - Dermatopathology- DERM LAB  Differential Diagnosis: Invasive squamous cell carcinoma vs hak vs prurigo  Check Margins Yes/No?:    Comments:    Dermpath Lab: Routine Histopathology (formalin-fixed tissue)    Ddx Invasive squamous cell carcinoma     Actinic keratosis  Head - Anterior (Face)    - The premalignant nature of the disorder was reviewed and treatment options were reviewed.   - Patient agreeable to treatment with cryotherapy today.  Sites confirmed. Risks and benefits reviewed including but not limited to pain, redness, swelling, blister, scab, healing with hypo or hyperpigmentation, and scar. Chance of  recurrence or persistence reviewed.     Destr of lesion - Head - Anterior (Face)  Complexity: simple    Destruction method: cryotherapy    Informed consent: discussed and consent obtained    Lesion destroyed using liquid nitrogen: Yes    Region frozen until ice ball extended beyond lesion: Yes    Cryotherapy cycles:  1  Outcome: patient tolerated procedure well with no complications    Post-procedure details: wound care instructions given      Tinea versicolor  Left Inframammary Fold    Tinea versicolor  - The yeast related nature and risk for recurrence was reviewed.   - START ketoconazole 2% cream bid to area on chest until clear. Repeat as needed.    - Once clear, continue to use every 2-4 weeks for maintenance/prevention of recurrence.        ketoconazole (NIZOral) 2 % cream - Left Inframammary Fold  Apply to rash on left chest/abdomen twice daily for 4 weeks. Repeat as needed    Multiple benign melanocytic nevi    Benign melanocytic nevi  - Discussed benign nature and that no treatment is necessary unless it becomes painful or increases in size. Patient opts for clinical monitoring at this time.    - Sun protective behavior reviewed and encouraged including the use of over-the-counter sunscreen with SPF30+ daily (reapply every 1.5 hours when outdoors), UPF clothing, broad rimmed hats, sunglasses, and avoidance of midday sun. Home skin monitoring encouraged and how to monitor for skin cancer (changing or new moles, new rapidly growing or non-healing lesions) reviewed. Patient encouraged to call with interval concerns or changes.      Seborrheic keratoses    Seborrheic keratosis (-es)  - Discussed benign nature and that no treatment is necessary unless it becomes painful or increases in size. Patient opts for clinical monitoring at this time.      Cherry angioma    Cherry angioma(s)  - Discussed benign nature and that no treatment is necessary unless it becomes painful or increases in size. Patient opts for  clinical monitoring at this time.      Sun-damaged skin    Actinically damaged skin-  - Sun protective behavior reviewed and encouraged including the use of over-the-counter sunscreen with SPF30+ daily (reapply every 1.5 hours when outdoors), UPF clothing, broad rimmed hats, sunglasses, and avoidance of midday sun. Home skin monitoring encouraged and how to monitor for skin cancer (changing or new moles, new rapidly growing or non-healing lesions) reviewed. Patient encouraged to call with interval concerns or changes.      Skin cancer screening    Related Procedures  Follow Up In Dermatology - Established Patient       Call with results  [facility]-- fuv yearly for skin check recommended.      Geeta Wiley MD

## 2025-04-11 LAB
LABORATORY COMMENT REPORT: NORMAL
PATH REPORT.FINAL DX SPEC: NORMAL
PATH REPORT.GROSS SPEC: NORMAL
PATH REPORT.RELEVANT HX SPEC: NORMAL
PATH REPORT.TOTAL CANCER: NORMAL

## 2025-05-08 NOTE — ASSESSMENT & PLAN NOTE
Reviewed lipid panel: Total cholesterol 85, triglycerides 154, HDL 33 and LDL 28.  The Repatha at 140 mg every 2 weeks has been effective in lowering LDL cholesterol.  No change necessary at this time.  Will need to repeat fasting lipid panel approximately 5/26

## 2025-05-08 NOTE — ASSESSMENT & PLAN NOTE
Hypertension has been somewhat difficult to control.  We checked plasma aldosterone and renin levels which came out normal, thus not suggestive of hyper aldosteronism.  On last visit we added amlodipine 5 mg daily and attempt to better control blood pressure.  We also checked a basic metabolic panel: Sodium 141, potassium 4.4, creatinine 0.71 with a GFR of 102.

## 2025-05-12 ENCOUNTER — LAB (OUTPATIENT)
Dept: LAB | Facility: HOSPITAL | Age: 65
End: 2025-05-12
Payer: MEDICARE

## 2025-05-13 LAB
CHOLEST SERPL-MCNC: 94 MG/DL
CHOLEST/HDLC SERPL: 2.8 (CALC)
HDLC SERPL-MCNC: 33 MG/DL
LDLC SERPL CALC-MCNC: 36 MG/DL (CALC)
NONHDLC SERPL-MCNC: 61 MG/DL (CALC)
TRIGL SERPL-MCNC: 172 MG/DL

## 2025-05-15 ENCOUNTER — APPOINTMENT (OUTPATIENT)
Facility: CLINIC | Age: 65
End: 2025-05-15
Payer: MEDICARE

## 2025-05-15 DIAGNOSIS — I10 PRIMARY HYPERTENSION: Primary | ICD-10-CM

## 2025-05-15 DIAGNOSIS — I25.10 ATHEROSCLEROSIS OF NATIVE CORONARY ARTERY OF NATIVE HEART, UNSPECIFIED WHETHER ANGINA PRESENT: ICD-10-CM

## 2025-05-15 DIAGNOSIS — E78.5 HYPERLIPIDEMIA, ACQUIRED: ICD-10-CM

## 2025-05-19 ENCOUNTER — OFFICE VISIT (OUTPATIENT)
Facility: CLINIC | Age: 65
End: 2025-05-19
Payer: MEDICARE

## 2025-05-19 VITALS
DIASTOLIC BLOOD PRESSURE: 68 MMHG | SYSTOLIC BLOOD PRESSURE: 116 MMHG | WEIGHT: 223 LBS | BODY MASS INDEX: 30.24 KG/M2 | HEART RATE: 59 BPM | OXYGEN SATURATION: 97 %

## 2025-05-19 DIAGNOSIS — I25.10 ATHEROSCLEROSIS OF NATIVE CORONARY ARTERY OF NATIVE HEART, UNSPECIFIED WHETHER ANGINA PRESENT: Primary | ICD-10-CM

## 2025-05-19 DIAGNOSIS — I10 PRIMARY HYPERTENSION: ICD-10-CM

## 2025-05-19 DIAGNOSIS — E78.5 HYPERLIPIDEMIA, ACQUIRED: ICD-10-CM

## 2025-05-19 PROCEDURE — 99213 OFFICE O/P EST LOW 20 MIN: CPT | Performed by: INTERNAL MEDICINE

## 2025-05-19 PROCEDURE — 3078F DIAST BP <80 MM HG: CPT | Performed by: INTERNAL MEDICINE

## 2025-05-19 PROCEDURE — 1126F AMNT PAIN NOTED NONE PRSNT: CPT | Performed by: INTERNAL MEDICINE

## 2025-05-19 PROCEDURE — 1036F TOBACCO NON-USER: CPT | Performed by: INTERNAL MEDICINE

## 2025-05-19 PROCEDURE — 1159F MED LIST DOCD IN RCRD: CPT | Performed by: INTERNAL MEDICINE

## 2025-05-19 PROCEDURE — 3074F SYST BP LT 130 MM HG: CPT | Performed by: INTERNAL MEDICINE

## 2025-05-19 ASSESSMENT — ENCOUNTER SYMPTOMS
LOSS OF SENSATION IN FEET: 0
DEPRESSION: 0
HEMATURIA: 0
PARESTHESIAS: 0
SHORTNESS OF BREATH: 0
DYSPNEA ON EXERTION: 0
DYSURIA: 0
BLURRED VISION: 0
ABDOMINAL PAIN: 0
NUMBNESS: 0
OCCASIONAL FEELINGS OF UNSTEADINESS: 0
COUGH: 0
PALPITATIONS: 0

## 2025-05-19 ASSESSMENT — PAIN SCALES - GENERAL: PAINLEVEL_OUTOF10: 0-NO PAIN

## 2025-05-19 ASSESSMENT — PATIENT HEALTH QUESTIONNAIRE - PHQ9
2. FEELING DOWN, DEPRESSED OR HOPELESS: NOT AT ALL
1. LITTLE INTEREST OR PLEASURE IN DOING THINGS: NOT AT ALL
SUM OF ALL RESPONSES TO PHQ9 QUESTIONS 1 AND 2: 0

## 2025-05-19 NOTE — PROGRESS NOTES
Subjective   Alex Ball is a 65 y.o. male.    Chief Complaint:  3 month f/u    HPI  65-year-old male with history of coronary artery disease hypertension and hyperlipidemia reports for follow-up visit.  On last visit the patient blood pressure was elevated.  We added amlodipine 5 mg daily as simple antihypertensive agent.  And had the patient reduce or eliminate nonsteroidal anti-inflammatory agents.  I did check renin and aldosterone levels which were not suggestive of hyperaldosteronism.  Currently patient doing relatively well with no chest pain or anginal symptoms.  Does have chronic osteoarthritic joint pains.    Review of Systems   Constitutional: Negative for malaise/fatigue.   HENT:  Negative for congestion.    Eyes:  Negative for blurred vision.   Cardiovascular:  Negative for chest pain, dyspnea on exertion and palpitations.   Respiratory:  Negative for cough and shortness of breath.    Musculoskeletal:  Positive for arthritis and joint pain.   Gastrointestinal:  Negative for abdominal pain.   Genitourinary:  Negative for dysuria and hematuria.   Neurological:  Negative for numbness and paresthesias.       Objective   Constitutional:       Appearance: Not in distress.   Eyes:      Conjunctiva/sclera: Conjunctivae normal.   Neck:      Vascular: JVD normal.   Pulmonary:      Breath sounds: Normal breath sounds. No wheezing. No rhonchi. No rales.   Cardiovascular:      Normal rate. Regular rhythm.      Murmurs: There is no murmur.      No gallop.  No click. No rub.   Abdominal:      Palpations: Abdomen is soft.   Neurological:      General: No focal deficit present.      Mental Status: Alert.         Lab Review:       Assessment/Plan   There were no encounter diagnoses.    Primary hypertension  Blood pressure now well-controlled with the simple amlodipine 5 mg daily.  We did check aldosterone and renin levels not suggestive of hyperaldosteronism.  Good response to the amlodipine which we will  continue at this time.    Hyperlipidemia, acquired  Reviewed lipid panel with LDL cholesterol of 36.  The patient is statin intolerant but the Repatha has been effective in lowering LDL cholesterol.  He is now on Medicare and the cost of Repatha has gone up significantly.    Atherosclerosis of coronary artery  Remote percutaneous coronary intervention with bare-metal stent placed into his right coronary artery back in March 2011.  Currently has no chest pain or anginal symptoms.  Will continue standard risk factor modification and follow on a clinical basis.

## 2025-05-19 NOTE — ASSESSMENT & PLAN NOTE
Blood pressure now well-controlled with the simple amlodipine 5 mg daily.  We did check aldosterone and renin levels not suggestive of hyperaldosteronism.  Good response to the amlodipine which we will continue at this time.

## 2025-05-19 NOTE — ASSESSMENT & PLAN NOTE
Remote percutaneous coronary intervention with bare-metal stent placed into his right coronary artery back in March 2011.  Currently has no chest pain or anginal symptoms.  Will continue standard risk factor modification and follow on a clinical basis.

## 2025-05-19 NOTE — ASSESSMENT & PLAN NOTE
Reviewed lipid panel with LDL cholesterol of 36.  The patient is statin intolerant but the Repatha has been effective in lowering LDL cholesterol.  He is now on Medicare and the cost of Repatha has gone up significantly.

## 2026-04-15 ENCOUNTER — APPOINTMENT (OUTPATIENT)
Dept: DERMATOLOGY | Facility: CLINIC | Age: 66
End: 2026-04-15
Payer: MEDICARE